# Patient Record
Sex: FEMALE | Race: WHITE | NOT HISPANIC OR LATINO | URBAN - METROPOLITAN AREA
[De-identification: names, ages, dates, MRNs, and addresses within clinical notes are randomized per-mention and may not be internally consistent; named-entity substitution may affect disease eponyms.]

---

## 2018-05-07 VITALS
RESPIRATION RATE: 18 BRPM | DIASTOLIC BLOOD PRESSURE: 80 MMHG | HEART RATE: 91 BPM | WEIGHT: 148.59 LBS | TEMPERATURE: 98 F | SYSTOLIC BLOOD PRESSURE: 123 MMHG | OXYGEN SATURATION: 99 %

## 2018-05-07 LAB
ALBUMIN SERPL ELPH-MCNC: 4.9 G/DL — SIGNIFICANT CHANGE UP (ref 3.3–5)
ALP SERPL-CCNC: 88 U/L — SIGNIFICANT CHANGE UP (ref 40–120)
ALT FLD-CCNC: 92 U/L — HIGH (ref 10–45)
ANION GAP SERPL CALC-SCNC: 14 MMOL/L — SIGNIFICANT CHANGE UP (ref 5–17)
APPEARANCE UR: CLEAR — SIGNIFICANT CHANGE UP
APTT BLD: 38.4 SEC — HIGH (ref 27.5–37.4)
AST SERPL-CCNC: 92 U/L — HIGH (ref 10–40)
BASOPHILS NFR BLD AUTO: 0.2 % — SIGNIFICANT CHANGE UP (ref 0–2)
BILIRUB SERPL-MCNC: 0.5 MG/DL — SIGNIFICANT CHANGE UP (ref 0.2–1.2)
BILIRUB UR-MCNC: (no result)
BUN SERPL-MCNC: 17 MG/DL — SIGNIFICANT CHANGE UP (ref 7–23)
CALCIUM SERPL-MCNC: 9.6 MG/DL — SIGNIFICANT CHANGE UP (ref 8.4–10.5)
CHLORIDE SERPL-SCNC: 100 MMOL/L — SIGNIFICANT CHANGE UP (ref 96–108)
CO2 SERPL-SCNC: 25 MMOL/L — SIGNIFICANT CHANGE UP (ref 22–31)
COLOR SPEC: YELLOW — SIGNIFICANT CHANGE UP
CREAT SERPL-MCNC: 0.66 MG/DL — SIGNIFICANT CHANGE UP (ref 0.5–1.3)
DIFF PNL FLD: NEGATIVE — SIGNIFICANT CHANGE UP
EOSINOPHIL NFR BLD AUTO: 0.7 % — SIGNIFICANT CHANGE UP (ref 0–6)
GLUCOSE SERPL-MCNC: 111 MG/DL — HIGH (ref 70–99)
GLUCOSE UR QL: NEGATIVE — SIGNIFICANT CHANGE UP
HCT VFR BLD CALC: 36 % — SIGNIFICANT CHANGE UP (ref 34.5–45)
HGB BLD-MCNC: 12.1 G/DL — SIGNIFICANT CHANGE UP (ref 11.5–15.5)
INR BLD: 1.09 — SIGNIFICANT CHANGE UP (ref 0.88–1.16)
KETONES UR-MCNC: (no result) MG/DL
LEUKOCYTE ESTERASE UR-ACNC: NEGATIVE — SIGNIFICANT CHANGE UP
LYMPHOCYTES # BLD AUTO: 16.9 % — SIGNIFICANT CHANGE UP (ref 13–44)
MCHC RBC-ENTMCNC: 28.5 PG — SIGNIFICANT CHANGE UP (ref 27–34)
MCHC RBC-ENTMCNC: 33.6 G/DL — SIGNIFICANT CHANGE UP (ref 32–36)
MCV RBC AUTO: 84.7 FL — SIGNIFICANT CHANGE UP (ref 80–100)
MONOCYTES NFR BLD AUTO: 6.4 % — SIGNIFICANT CHANGE UP (ref 2–14)
NEUTROPHILS NFR BLD AUTO: 75.8 % — SIGNIFICANT CHANGE UP (ref 43–77)
NITRITE UR-MCNC: NEGATIVE — SIGNIFICANT CHANGE UP
PH UR: 7.5 — SIGNIFICANT CHANGE UP (ref 5–8)
PLATELET # BLD AUTO: 336 K/UL — SIGNIFICANT CHANGE UP (ref 150–400)
POTASSIUM SERPL-MCNC: 4.2 MMOL/L — SIGNIFICANT CHANGE UP (ref 3.5–5.3)
POTASSIUM SERPL-SCNC: 4.2 MMOL/L — SIGNIFICANT CHANGE UP (ref 3.5–5.3)
PROT SERPL-MCNC: 7.9 G/DL — SIGNIFICANT CHANGE UP (ref 6–8.3)
PROT UR-MCNC: NEGATIVE MG/DL — SIGNIFICANT CHANGE UP
PROTHROM AB SERPL-ACNC: 12.1 SEC — SIGNIFICANT CHANGE UP (ref 9.8–12.7)
RBC # BLD: 4.25 M/UL — SIGNIFICANT CHANGE UP (ref 3.8–5.2)
RBC # FLD: 13.9 % — SIGNIFICANT CHANGE UP (ref 10.3–16.9)
SODIUM SERPL-SCNC: 139 MMOL/L — SIGNIFICANT CHANGE UP (ref 135–145)
SP GR SPEC: 1.01 — SIGNIFICANT CHANGE UP (ref 1–1.03)
UROBILINOGEN FLD QL: 1 E.U./DL — SIGNIFICANT CHANGE UP
WBC # BLD: 4.4 K/UL — SIGNIFICANT CHANGE UP (ref 3.8–10.5)
WBC # FLD AUTO: 4.4 K/UL — SIGNIFICANT CHANGE UP (ref 3.8–10.5)

## 2018-05-07 PROCEDURE — 74177 CT ABD & PELVIS W/CONTRAST: CPT | Mod: 26

## 2018-05-07 PROCEDURE — 99285 EMERGENCY DEPT VISIT HI MDM: CPT

## 2018-05-07 RX ORDER — MORPHINE SULFATE 50 MG/1
4 CAPSULE, EXTENDED RELEASE ORAL ONCE
Qty: 0 | Refills: 0 | Status: DISCONTINUED | OUTPATIENT
Start: 2018-05-07 | End: 2018-05-07

## 2018-05-07 RX ORDER — ONDANSETRON 8 MG/1
4 TABLET, FILM COATED ORAL ONCE
Qty: 0 | Refills: 0 | Status: COMPLETED | OUTPATIENT
Start: 2018-05-07 | End: 2018-05-07

## 2018-05-07 RX ORDER — IOHEXOL 300 MG/ML
50 INJECTION, SOLUTION INTRAVENOUS ONCE
Qty: 0 | Refills: 0 | Status: COMPLETED | OUTPATIENT
Start: 2018-05-07 | End: 2018-05-07

## 2018-05-07 RX ORDER — SODIUM CHLORIDE 9 MG/ML
1000 INJECTION INTRAMUSCULAR; INTRAVENOUS; SUBCUTANEOUS ONCE
Qty: 0 | Refills: 0 | Status: COMPLETED | OUTPATIENT
Start: 2018-05-07 | End: 2018-05-07

## 2018-05-07 RX ADMIN — SODIUM CHLORIDE 1000 MILLILITER(S): 9 INJECTION INTRAMUSCULAR; INTRAVENOUS; SUBCUTANEOUS at 15:00

## 2018-05-07 RX ADMIN — MORPHINE SULFATE 4 MILLIGRAM(S): 50 CAPSULE, EXTENDED RELEASE ORAL at 18:04

## 2018-05-07 RX ADMIN — ONDANSETRON 4 MILLIGRAM(S): 8 TABLET, FILM COATED ORAL at 15:43

## 2018-05-07 RX ADMIN — IOHEXOL 50 MILLILITER(S): 300 INJECTION, SOLUTION INTRAVENOUS at 16:36

## 2018-05-07 RX ADMIN — MORPHINE SULFATE 4 MILLIGRAM(S): 50 CAPSULE, EXTENDED RELEASE ORAL at 15:43

## 2018-05-07 RX ADMIN — ONDANSETRON 4 MILLIGRAM(S): 8 TABLET, FILM COATED ORAL at 19:59

## 2018-05-07 NOTE — ED ADULT NURSE NOTE - PSH
Congenital anomaly of brain  Colloid cyst of third ventricle  Deviated septum  repaired  H-type congenital tracheoesophageal fistula  s/p repair

## 2018-05-07 NOTE — ED ADULT NURSE NOTE - PMH
Asthma    Congenital anomaly of brain  Colloid cyst of third ventricle  Congenital tracheoesophageal fistula, esophageal atresia and stenosis  Tracheoesophageal fistula, congenital  Endometriosis    Epilepsy  Epilepsy  GERD (gastroesophageal reflux disease)    Myalgia and myositis  Fibromyalgia

## 2018-05-07 NOTE — CONSULT NOTE ADULT - SUBJECTIVE AND OBJECTIVE BOX
33yo G0 with hx of endometriosis was sent from GYN Dr. Multani's office for intolerable abdominal pain. Pt states constant, lower cramping abdominal pain for 2 months. She states associated nausea and vomiting. Last bowel movement was "one week ago." Pt states she gets Percocet from psychiatrist for pain control which she tried at home with no relief. She has been at [facility] twice in the last 2 months and was discharged with diagnosis of "gastroparesis." She states she had CT scan done there which was negative. LMP was 18. Pt has associated urinary frequency but no dysuria. Pt feels these symptoms are similar to the symptoms she had in 2016 when her endometriosis was at its "worst."  Pt denies fever, chills, chest pain, SOB, vaginal bleeding, lightheadedness or dizziness.     OB/GYN Hx: G0   Pt denies hx of fibroids   Laparoscopic endometriosis excision 2016   PMHx: Endometriosis, GERD, Fibromyalgia, Anxiety   SHx: Laparoscopic endometriosis excision 2016   Meds: Lyrica 600mg BID, Zofran, Percocet, Pantoprazole   Allergies: Avelox, Biaxin, Compazine, Doxycyline, Robaxin     PHYSICAL EXAM:   Vital Signs Last 24 Hrs  T(C): 36.9 (07 May 2018 14:03), Max: 36.9 (07 May 2018 14:03)  T(F): 98.5 (07 May 2018 14:03), Max: 98.5 (07 May 2018 14:03)  HR: 91 (07 May 2018 14:03) (91 - 91)  BP: 123/80 (07 May 2018 14:03) (123/80 - 123/80)  BP(mean): --  RR: 18 (07 May 2018 14:03) (18 - 18)  SpO2: 99% (07 May 2018 14:03) (99% - 99%)    **************************  Constitutional: No acute distress  Respiratory: Clear to ausculation bilaterally  Cardiovascular: regular rate and rhythm  Gastrointestinal: positive bowel sounds, soft, tenderness to palpation lower abdomen, no rebound or guarding   Pelvic exam: normal external genitalia, no masses felt, no CMT or adnexal tenderness   Extremities: no calf tenderness or swelling    LABS:                        12.1   4.4   )-----------( 336      ( 07 May 2018 15:05 )             36.0     05-    139  |  100  |  17  ----------------------------<  111<H>  4.2   |  25  |  0.66    Ca    9.6      07 May 2018 15:05    TPro  7.9  /  Alb  4.9  /  TBili  0.5  /  DBili  x   /  AST  92<H>  /  ALT  92<H>  /  AlkPhos  88  05-07    PT/INR - ( 07 May 2018 15:05 )   PT: 12.1 sec;   INR: 1.09          PTT - ( 07 May 2018 15:05 )  PTT:38.4 sec  Urinalysis Basic - ( 07 May 2018 15:12 )    Color: Yellow / Appearance: Clear / S.015 / pH: x  Gluc: x / Ketone: Trace mg/dL  / Bili: Small / Urobili: 1.0 E.U./dL   Blood: x / Protein: NEGATIVE mg/dL / Nitrite: NEGATIVE   Leuk Esterase: NEGATIVE / RBC: x / WBC x   Sq Epi: x / Non Sq Epi: x / Bacteria: x          RADIOLOGY & ADDITIONAL STUDIES:

## 2018-05-07 NOTE — ED PROVIDER NOTE - MEDICAL DECISION MAKING DETAILS
pt with hx of endometriosis s/p surgery 2016 presents from [facility] office for evaluation of abd pain x 2 months - pe - seems to be in diffuse abd pain without focal tenderness or guarding asking for multiple doses of pain meds in ED - ct shows ? dilated cbd us neg for stones and seems to be normal cbd, pelvic us normal study, discussed extensively with gyn throughout patient's stay in ED and decision made to admit for pain control with +/- lap.

## 2018-05-07 NOTE — CONSULT NOTE ADULT - ASSESSMENT
31yo G0 with hx of endometriosis was sent from GYN Dr. Multani's office for intolerable abdominal pain. Pt received Morphine in ED with minimal relief. Vital signs stable, labs and UA within normal limits. CT pending.

## 2018-05-08 ENCOUNTER — INPATIENT (INPATIENT)
Facility: HOSPITAL | Age: 33
LOS: 0 days | Discharge: HOME | DRG: 760 | End: 2018-05-08
Attending: OBSTETRICS & GYNECOLOGY | Admitting: OBSTETRICS & GYNECOLOGY
Payer: COMMERCIAL

## 2018-05-08 ENCOUNTER — TRANSCRIPTION ENCOUNTER (OUTPATIENT)
Age: 33
End: 2018-05-08

## 2018-05-08 VITALS
SYSTOLIC BLOOD PRESSURE: 120 MMHG | TEMPERATURE: 98 F | HEART RATE: 80 BPM | DIASTOLIC BLOOD PRESSURE: 83 MMHG | OXYGEN SATURATION: 97 % | RESPIRATION RATE: 17 BRPM

## 2018-05-08 DIAGNOSIS — F33.9 MAJOR DEPRESSIVE DISORDER, RECURRENT, UNSPECIFIED: ICD-10-CM

## 2018-05-08 DIAGNOSIS — Q39.2 CONGENITAL TRACHEO-ESOPHAGEAL FISTULA WITHOUT ATRESIA: Chronic | ICD-10-CM

## 2018-05-08 DIAGNOSIS — J34.2 DEVIATED NASAL SEPTUM: Chronic | ICD-10-CM

## 2018-05-08 PROCEDURE — 76705 ECHO EXAM OF ABDOMEN: CPT

## 2018-05-08 PROCEDURE — 76830 TRANSVAGINAL US NON-OB: CPT

## 2018-05-08 PROCEDURE — 96376 TX/PRO/DX INJ SAME DRUG ADON: CPT

## 2018-05-08 PROCEDURE — 96374 THER/PROPH/DIAG INJ IV PUSH: CPT | Mod: XU

## 2018-05-08 PROCEDURE — 99223 1ST HOSP IP/OBS HIGH 75: CPT

## 2018-05-08 PROCEDURE — 96375 TX/PRO/DX INJ NEW DRUG ADDON: CPT

## 2018-05-08 PROCEDURE — 99285 EMERGENCY DEPT VISIT HI MDM: CPT | Mod: 25

## 2018-05-08 PROCEDURE — 76705 ECHO EXAM OF ABDOMEN: CPT | Mod: 26

## 2018-05-08 PROCEDURE — 74177 CT ABD & PELVIS W/CONTRAST: CPT

## 2018-05-08 PROCEDURE — 76830 TRANSVAGINAL US NON-OB: CPT | Mod: 26

## 2018-05-08 RX ORDER — ONDANSETRON 8 MG/1
4 TABLET, FILM COATED ORAL EVERY 6 HOURS
Qty: 0 | Refills: 0 | Status: DISCONTINUED | OUTPATIENT
Start: 2018-05-08 | End: 2018-05-08

## 2018-05-08 RX ORDER — MORPHINE SULFATE 50 MG/1
4 CAPSULE, EXTENDED RELEASE ORAL ONCE
Qty: 0 | Refills: 0 | Status: DISCONTINUED | OUTPATIENT
Start: 2018-05-08 | End: 2018-05-08

## 2018-05-08 RX ORDER — ACETAMINOPHEN 500 MG
1000 TABLET ORAL ONCE
Qty: 0 | Refills: 0 | Status: COMPLETED | OUTPATIENT
Start: 2018-05-08 | End: 2018-05-08

## 2018-05-08 RX ORDER — ONDANSETRON 8 MG/1
0 TABLET, FILM COATED ORAL
Qty: 0 | Refills: 0 | COMMUNITY

## 2018-05-08 RX ORDER — KETOROLAC TROMETHAMINE 30 MG/ML
30 SYRINGE (ML) INJECTION EVERY 6 HOURS
Qty: 0 | Refills: 0 | Status: DISCONTINUED | OUTPATIENT
Start: 2018-05-08 | End: 2018-05-08

## 2018-05-08 RX ORDER — PANTOPRAZOLE SODIUM 20 MG/1
40 TABLET, DELAYED RELEASE ORAL
Qty: 0 | Refills: 0 | Status: DISCONTINUED | OUTPATIENT
Start: 2018-05-08 | End: 2018-05-08

## 2018-05-08 RX ORDER — ONDANSETRON 8 MG/1
4 TABLET, FILM COATED ORAL ONCE
Qty: 0 | Refills: 0 | Status: COMPLETED | OUTPATIENT
Start: 2018-05-08 | End: 2018-05-08

## 2018-05-08 RX ORDER — LEVETIRACETAM 250 MG/1
1000 TABLET, FILM COATED ORAL
Qty: 0 | Refills: 0 | Status: DISCONTINUED | OUTPATIENT
Start: 2018-05-08 | End: 2018-05-08

## 2018-05-08 RX ORDER — PANTOPRAZOLE SODIUM 20 MG/1
1 TABLET, DELAYED RELEASE ORAL
Qty: 0 | Refills: 0 | COMMUNITY
Start: 2018-05-08

## 2018-05-08 RX ORDER — SODIUM CHLORIDE 9 MG/ML
1000 INJECTION, SOLUTION INTRAVENOUS
Qty: 0 | Refills: 0 | Status: DISCONTINUED | OUTPATIENT
Start: 2018-05-08 | End: 2018-05-08

## 2018-05-08 RX ORDER — DIPHENHYDRAMINE HCL 50 MG
25 CAPSULE ORAL ONCE
Qty: 0 | Refills: 0 | Status: COMPLETED | OUTPATIENT
Start: 2018-05-08 | End: 2018-05-08

## 2018-05-08 RX ORDER — POLYETHYLENE GLYCOL 3350 17 G/17G
17 POWDER, FOR SOLUTION ORAL DAILY
Qty: 0 | Refills: 0 | Status: DISCONTINUED | OUTPATIENT
Start: 2018-05-08 | End: 2018-05-08

## 2018-05-08 RX ORDER — OXYCODONE AND ACETAMINOPHEN 5; 325 MG/1; MG/1
1 TABLET ORAL EVERY 4 HOURS
Qty: 0 | Refills: 0 | Status: DISCONTINUED | OUTPATIENT
Start: 2018-05-08 | End: 2018-05-08

## 2018-05-08 RX ORDER — HYDROMORPHONE HYDROCHLORIDE 2 MG/ML
0.5 INJECTION INTRAMUSCULAR; INTRAVENOUS; SUBCUTANEOUS ONCE
Qty: 0 | Refills: 0 | Status: DISCONTINUED | OUTPATIENT
Start: 2018-05-08 | End: 2018-05-08

## 2018-05-08 RX ORDER — ACETAMINOPHEN 500 MG
650 TABLET ORAL ONCE
Qty: 0 | Refills: 0 | Status: COMPLETED | OUTPATIENT
Start: 2018-05-08 | End: 2018-05-08

## 2018-05-08 RX ADMIN — Medication 1000 MILLIGRAM(S): at 02:35

## 2018-05-08 RX ADMIN — Medication 30 MILLIGRAM(S): at 12:25

## 2018-05-08 RX ADMIN — Medication 225 MILLIGRAM(S): at 17:15

## 2018-05-08 RX ADMIN — SODIUM CHLORIDE 125 MILLILITER(S): 9 INJECTION, SOLUTION INTRAVENOUS at 02:51

## 2018-05-08 RX ADMIN — MORPHINE SULFATE 4 MILLIGRAM(S): 50 CAPSULE, EXTENDED RELEASE ORAL at 00:48

## 2018-05-08 RX ADMIN — OXYCODONE AND ACETAMINOPHEN 1 TABLET(S): 5; 325 TABLET ORAL at 17:57

## 2018-05-08 RX ADMIN — HYDROMORPHONE HYDROCHLORIDE 0.5 MILLIGRAM(S): 2 INJECTION INTRAMUSCULAR; INTRAVENOUS; SUBCUTANEOUS at 03:30

## 2018-05-08 RX ADMIN — Medication 225 MILLIGRAM(S): at 02:51

## 2018-05-08 RX ADMIN — Medication 650 MILLIGRAM(S): at 12:03

## 2018-05-08 RX ADMIN — Medication 30 MILLIGRAM(S): at 17:35

## 2018-05-08 RX ADMIN — Medication 25 MILLIGRAM(S): at 04:20

## 2018-05-08 RX ADMIN — Medication 30 MILLIGRAM(S): at 17:15

## 2018-05-08 RX ADMIN — ONDANSETRON 4 MILLIGRAM(S): 8 TABLET, FILM COATED ORAL at 12:03

## 2018-05-08 RX ADMIN — HYDROMORPHONE HYDROCHLORIDE 0.5 MILLIGRAM(S): 2 INJECTION INTRAMUSCULAR; INTRAVENOUS; SUBCUTANEOUS at 04:00

## 2018-05-08 RX ADMIN — ONDANSETRON 4 MILLIGRAM(S): 8 TABLET, FILM COATED ORAL at 01:39

## 2018-05-08 RX ADMIN — MORPHINE SULFATE 4 MILLIGRAM(S): 50 CAPSULE, EXTENDED RELEASE ORAL at 01:39

## 2018-05-08 RX ADMIN — Medication 30 MILLIGRAM(S): at 12:07

## 2018-05-08 RX ADMIN — ONDANSETRON 4 MILLIGRAM(S): 8 TABLET, FILM COATED ORAL at 17:15

## 2018-05-08 RX ADMIN — Medication 400 MILLIGRAM(S): at 01:40

## 2018-05-08 NOTE — DISCHARGE NOTE ADULT - HOSPITAL COURSE
Pt was admitted with acute on chronic pain in the setting of endometriosis.  Pain improved and pt remained clinically stable without any indication for operative management.  Low suspicion for acute abdomen.  Discharged home in stable condition with plan for outpatient follow up.

## 2018-05-08 NOTE — DISCHARGE NOTE ADULT - MEDICATION SUMMARY - MEDICATIONS TO TAKE
I will START or STAY ON the medications listed below when I get home from the hospital:    bisacodyl 10 mg rectal suppository  -- 1 suppository(ies) rectally once a day, As needed, Constipation  -- Indication: For constipation    pantoprazole 40 mg oral delayed release tablet  -- 1 tab(s) by mouth once a day (before a meal)  -- Indication: For GERD

## 2018-05-08 NOTE — DISCHARGE NOTE ADULT - PLAN OF CARE
n/a Continue oral pain medications as needed.  Follow up in office for preoperative counseling and planning.

## 2018-05-08 NOTE — DISCHARGE NOTE ADULT - CARE PROVIDER_API CALL
Olivia Multani), Obstetrics and Gynecology  [address] [place] [address]  Phone: [phone]  Fax: [phone]

## 2018-05-08 NOTE — DISCHARGE NOTE ADULT - CARE PLAN
Principal Discharge DX:	Endometriosis  Goal:	n/a  Assessment and plan of treatment:	Continue oral pain medications as needed.  Follow up in office for preoperative counseling and planning.

## 2018-05-08 NOTE — PROGRESS NOTE ADULT - ASSESSMENT
31yo G0 with hx of endometriosis s/p l/s endo resection 2016 presents with intractable pelvic pain. Patient to be admitted for pain management.                                    1. Neuro/Pain:  IV tylenol prn  2  CV:   VS per routine  3. Pulm: Encourage ISS  4. GI: NPO, dulcolax suppository  5. :  Voiding  6. Heme: Stable  7. ID: --  8. DVT ppx: SCDs  9. Dispo: improvement of pain 31yo HD2 w/hx of endometriosis s/p l/s endo resection 2016 presents with intractable abdominal pain. Patient is admitted for pain control. Physical examination not consistent with Pt's verbal pain level of 10/10.                              1. Neuro/Pain: Tylenol prn   2  CV:   VS per routine  3. Pulm: Encourage ISS  4. GI: NPO, dulcolax suppository  5. :  Voiding  6. Heme: Stable  7. ID: --  8. DVT ppx: SCDs  9. F/u psych consult

## 2018-05-08 NOTE — PROGRESS NOTE ADULT - ASSESSMENT
33yo G0 with hx of endometriosis s/p l/s endo resection 2016 presents with intractable pelvic pain. Patient to be admitted for pain management.                                    1. Neuro/Pain:  IV tylenol prn  2  CV:   VS per routine  3. Pulm: Encourage ISS  4. GI: NPO, dulcolax suppository  5. :  Voiding  6. Heme: Stable  7. ID: --  8. DVT ppx: SCDs  9. Dispo: improvement of pain

## 2018-05-08 NOTE — PROGRESS NOTE ADULT - SUBJECTIVE AND OBJECTIVE BOX
Patient seen and evaluated at bedside. Pt states 6/10 pain not controlled with Toradol or Tylenol. Pt states she wants Morphine or Dilaudid because that is what helps with her pain.    Pt denies fever, chills, chest pain, SOB, nausea, vomiting, lightheadedness, or dizziness.      T(F): 98.4 (18 @ 12:58), Max: 98.7 (18 @ 00:52)  HR: 54 (18 @ 12:58) (54 - 77)  BP: 117/81 (18 @ 12:58) (115/81 - 131/90)  RR: 16 (18 @ 12:58) (16 - 18)  SpO2: 99% (18 @ 12:58) (98% - 100%)  Wt(kg): --  I&O's Summary    08 May 2018 07:01  -  08 May 2018 14:07  --------------------------------------------------------  IN: 0 mL / OUT: 650 mL / NET: -650 mL    MEDICATIONS  (STANDING):  lactated ringers. 1000 milliLiter(s) (125 mL/Hr) IV Continuous <Continuous>  pantoprazole    Tablet 40 milliGRAM(s) Oral before breakfast  pregabalin 225 milliGRAM(s) Oral every 12 hours    MEDICATIONS  (PRN):  bisacodyl Suppository 10 milliGRAM(s) Rectal daily PRN Constipation  ketorolac   Injectable 30 milliGRAM(s) IV Push every 6 hours PRN Severe Pain  ondansetron Injectable 4 milliGRAM(s) IV Push every 6 hours PRN Nausea and/or Vomiting    Physical Exam:  Constitutional: NAD  Abdomen: Soft, tenderness to deep palpation, nondistended, no guarding, no rebound, +bowel sounds  Extremities: no lower extremity edema or calve tenderness. SCDs in place     LABS:                        12.1   4.4   )-----------( 336      ( 07 May 2018 15:05 )             36.0     05-07    139  |  100  |  17  ----------------------------<  111<H>  4.2   |  25  |  0.66    Ca    9.6      07 May 2018 15:05    TPro  7.9  /  Alb  4.9  /  TBili  0.5  /  DBili  x   /  AST  92<H>  /  ALT  92<H>  /  AlkPhos  88  05-07    PT/INR - ( 07 May 2018 15:05 )   PT: 12.1 sec;   INR: 1.09          PTT - ( 07 May 2018 15:05 )  PTT:38.4 sec  Urinalysis Basic - ( 07 May 2018 15:12 )    Color: Yellow / Appearance: Clear / S.015 / pH: x  Gluc: x / Ketone: Trace mg/dL  / Bili: Small / Urobili: 1.0 E.U./dL   Blood: x / Protein: NEGATIVE mg/dL / Nitrite: NEGATIVE   Leuk Esterase: NEGATIVE / RBC: x / WBC x   Sq Epi: x / Non Sq Epi: x / Bacteria: x

## 2018-05-08 NOTE — BEHAVIORAL HEALTH ASSESSMENT NOTE - NSBHCHARTREVIEWVS_PSY_A_CORE FT
Vital Signs Last 24 Hrs  T(C): 36.9 (08 May 2018 12:58), Max: 37.1 (08 May 2018 00:52)  T(F): 98.4 (08 May 2018 12:58), Max: 98.7 (08 May 2018 00:52)  HR: 54 (08 May 2018 12:58) (54 - 77)  BP: 117/81 (08 May 2018 12:58) (115/81 - 131/90)  BP(mean): --  RR: 16 (08 May 2018 12:58) (16 - 18)  SpO2: 99% (08 May 2018 12:58) (98% - 100%)

## 2018-05-08 NOTE — BEHAVIORAL HEALTH ASSESSMENT NOTE - NSBHMEDSOTHERFT_PSY_A_CORE
Klonopin 1 mg BID, Lyrica 600 mg pO BID, Percocet 10 mg PO q4h, Trintellix 20 mg pO daily (patient says not taking), Protonix 40 mg pO daily

## 2018-05-08 NOTE — H&P ADULT - HISTORY OF PRESENT ILLNESS
33yo G0 with hx of endometriosis was sent from GYN Dr. Multani's office for intolerable abdominal pain. Pt states constant, lower cramping abdominal pain for 2 months. She states associated nausea and vomiting. Last bowel movement was "one week ago." Pt states she gets Percocet from psychiatrist for pain control which she tried at home with no relief. She has been at [facility] twice in the last 2 months and was discharged with diagnosis of "gastroparesis." She states she had CT scan done there which was negative. LMP was 18. Pt has associated urinary frequency but no dysuria. Pt feels these symptoms are similar to the symptoms she had in 2016 when her endometriosis was at its "worst."  Pt denies fever, chills, chest pain, SOB, vaginal bleeding, lightheadedness or dizziness.     OB/GYN Hx: G0   Pt denies hx of fibroids   Laparoscopic endometriosis excision 2016   PMHx: Endometriosis, GERD, Fibromyalgia, Anxiety, epilepsy  SHx: Laparoscopic endometriosis excision 2016, TE fistula repair as baby, spinal cord tumor removal  Meds: Lyrica 600mg BID, Zofran, Percocet, Pantoprazole, keppra 1000mg BID, klonopin  Allergies: Avelox, Biaxin, Compazine, Doxycyline, Robaxin     Vital Signs Last 24 Hrs  T(C): 37 (08 May 2018 01:23), Max: 37.1 (08 May 2018 00:52)  T(F): 98.6 (08 May 2018 01:23), Max: 98.7 (08 May 2018 00:52)  HR: 76 (08 May 2018 01:23) (73 - 91)  BP: 125/80 (08 May 2018 01:23) (123/80 - 131/90)  BP(mean): --  RR: 17 (08 May 2018 01:23) (17 - 18)  SpO2: 99% (08 May 2018 01:23) (98% - 100%)  Constitutional: No acute distress  Respiratory: Clear to ausculation bilaterally  Cardiovascular: regular rate and rhythm  Gastrointestinal: positive bowel sounds, soft, tenderness to palpation lower abdomen, no rebound or guarding   Pelvic exam: normal external genitalia, no palapable adnexal or rectovaginal masses, mild tenderness to right adnexa, no CMT   Extremities: no calf tenderness or swelling                          12.1   4.4   )-----------( 336      ( 07 May 2018 15:05 )             36.0       139  |  100  |  17  ----------------------------<  111<H>  4.2   |  25  |  0.66    Ca    9.6      07 May 2018 15:05    TPro  7.9  /  Alb  4.9  /  TBili  0.5  /  DBili  x   /  AST  92<H>  /  ALT  92<H>  /  AlkPhos  88    Urinalysis Basic - ( 07 May 2018 15:12 )    Color: Yellow / Appearance: Clear / S.015 / pH: x  Gluc: x / Ketone: Trace mg/dL  / Bili: Small / Urobili: 1.0 E.U./dL   Blood: x / Protein: NEGATIVE mg/dL / Nitrite: NEGATIVE   Leuk Esterase: NEGATIVE / RBC: x / WBC x   Sq Epi: x / Non Sq Epi: x / Bacteria: x      EXAM:  CT ABDOMEN AND PELVIS OC IC                          PROCEDURE DATE:  2018    Quantity of Contrast in Vial in ml: 100 Contrast Used: Optiray 350  Quantity of Contrast Wasted in ml: 8           INTERPRETATION:      CT of the ABDOMEN and PELVIS with intravenous contrast dated 2018   7:25 PM    INDICATION: Diffuse abdominal pain. Nausea and vomiting. History of   endometriosis.    TECHNIQUE: CT of the abdomen and pelvis was performed using oral and   intravenous contrast. Axial, sagittal and coronal images were produced   and reviewed.    PRIOR STUDIES: None.    FINDINGS: Images of the lower chest are unremarkable.    The liver is normal in appearance.  There is mild intra as well as extra   hepatic bile duct dilatation with the common bile duct measuring up to 7   mm. No CT evidence of choledocholithiasis or cholelithiasis. No   radiopaque stones are seen in the gallbladder.  The pancreas is normal in   appearance. No pancreatic duct dilatation. No splenic abnormalities are   seen.    The adrenal glands are unremarkable. The kidneys are normal in   appearance.        No abdominal aortic aneurysm is seen. No lymphadenopathy is seen.     Evaluation of the bowel demonstrates no bowel obstruction or free air.   Oral contrast reaches the rectum. There is mild wall thickening of the   sigmoid colon. No ascites is seen. Appendix is normal.     Images of the pelvis demonstrate the uterus and adnexae to be normal in   appearance. Urinary bladder is underdistended.      Evaluation of the osseous structures demonstrates no significant   abnormality.      IMPRESSION:    Mild wall thickening of the sigmoid colon which may be infectious or   inflammatory in etiology.    Mild intrahepatic as well as common bile duct dilatation measuring up to   7 mm. No CT evidence of cholelithiasis or choledocholithiasis. Further   imaging may be of value to include a dedicated hepatobiliary ultrasound   and as clinically indicated, an MRCP.    Normal appendix.      Procedure was performed in Emergency Department by a credentialed   Emergency Medicine Attending Physician    EXAM:  ER US ABDOMEN LTD [id number]                           PROCEDURE DATE:  2018                     INTERPRETATION:  Grayscale imaging of the right upper quadrant was   performed.    The gallbladder was visualized and scanned in longitudinal and transverse   planes.  The anterior gallbladder wall measured  0.31.cm.  The common bile duct measured 0.30 to 0.57.cm.  Gallstones not present.  Sludge not present.  Pericholecystic fluid not present.  Gallbladder wall edema not present.  Sonographic Blevins's sign not present.    IMPRESSION:Normal gallbladder.        Procedure was performed in Emergency Department by a credentialed   Emergency Medicine Attending Physician    EXAM:  ER US TRANSVAGINL NON OB [id number]                           PROCEDURE DATE:  2018                     INTERPRETATION:  Transabdominal and Endovaginal Grayscale imaging of the   uterus, ovaries, and adnexa performed.    The uterus was scanned in 2 planes. The uterus was empty.  The left ovary was visualized and measured 1.78cm x 2.32cm x 1.76cm.  The right ovary was visualized and measured 2.33 cm x 1.46cm x 2.89cm  Both ovaries were of normal echotexture.  The left ovary demonstrated normal arterial and venous blood flow.  The right ovary demonstrated normal arterial and venous blood flow.  There was no free fluid present.    IMPRESSION:   Normal study 33yo G0 with hx of endometriosis was sent from GYN Dr. Multani's office for intolerable abdominal pain. Pt states constant, lower cramping abdominal pain for 2 months. She states associated nausea and vomiting. Last bowel movement was "one week ago." Pt states she gets Percocet from psychiatrist for pain control which she tried at home with no relief. She has been at [facility] twice in the last 2 months and was discharged with diagnosis of "gastroparesis." She states she had CT scan done there which was negative. LMP was 18. Pt has associated urinary frequency but no dysuria. Pt feels these symptoms are similar to the symptoms she had in 2016 when her endometriosis was at its "worst."  Pt denies fever, chills, chest pain, SOB, vaginal bleeding, lightheadedness or dizziness.     OB/GYN Hx: G0   Pt denies hx of fibroids   Laparoscopic endometriosis excision 2016   PMHx: Endometriosis, GERD, Fibromyalgia, Anxiety, epilepsy - diagnosed in , had grand mal seizures. previously on keppra 1000mg BID, stopped taking over 1 year ago on her on accord, no seizures for over 1 year.  SHx: Laparoscopic endometriosis excision 2016, TE fistula repair as baby, spinal cord tumor removal  Meds: Lyrica 600mg BID, Zofran, Percocet, Pantoprazole, keppra 1000mg BID, klonopin  Allergies: Avelox, Biaxin, Compazine, Doxycyline, Robaxin     Vital Signs Last 24 Hrs  T(C): 37 (08 May 2018 01:23), Max: 37.1 (08 May 2018 00:52)  T(F): 98.6 (08 May 2018 01:23), Max: 98.7 (08 May 2018 00:52)  HR: 76 (08 May 2018 01:23) (73 - 91)  BP: 125/80 (08 May 2018 01:23) (123/80 - 131/90)  BP(mean): --  RR: 17 (08 May 2018 01:23) (17 - 18)  SpO2: 99% (08 May 2018 01:23) (98% - 100%)  Constitutional: No acute distress  Respiratory: Clear to ausculation bilaterally  Cardiovascular: regular rate and rhythm  Gastrointestinal: positive bowel sounds, soft, tenderness to palpation lower abdomen, no rebound or guarding   Pelvic exam: normal external genitalia, no palapable adnexal or rectovaginal masses, mild tenderness to right adnexa, no CMT   Extremities: no calf tenderness or swelling                          12.1   4.4   )-----------( 336      ( 07 May 2018 15:05 )             36.0       139  |  100  |  17  ----------------------------<  111<H>  4.2   |  25  |  0.66    Ca    9.6      07 May 2018 15:05    TPro  7.9  /  Alb  4.9  /  TBili  0.5  /  DBili  x   /  AST  92<H>  /  ALT  92<H>  /  AlkPhos  88    Urinalysis Basic - ( 07 May 2018 15:12 )    Color: Yellow / Appearance: Clear / S.015 / pH: x  Gluc: x / Ketone: Trace mg/dL  / Bili: Small / Urobili: 1.0 E.U./dL   Blood: x / Protein: NEGATIVE mg/dL / Nitrite: NEGATIVE   Leuk Esterase: NEGATIVE / RBC: x / WBC x   Sq Epi: x / Non Sq Epi: x / Bacteria: x      EXAM:  CT ABDOMEN AND PELVIS OC IC                          PROCEDURE DATE:  2018    Quantity of Contrast in Vial in ml: 100 Contrast Used: Optiray 350  Quantity of Contrast Wasted in ml: 8           INTERPRETATION:      CT of the ABDOMEN and PELVIS with intravenous contrast dated 2018   7:25 PM    INDICATION: Diffuse abdominal pain. Nausea and vomiting. History of   endometriosis.    TECHNIQUE: CT of the abdomen and pelvis was performed using oral and   intravenous contrast. Axial, sagittal and coronal images were produced   and reviewed.    PRIOR STUDIES: None.    FINDINGS: Images of the lower chest are unremarkable.    The liver is normal in appearance.  There is mild intra as well as extra   hepatic bile duct dilatation with the common bile duct measuring up to 7   mm. No CT evidence of choledocholithiasis or cholelithiasis. No   radiopaque stones are seen in the gallbladder.  The pancreas is normal in   appearance. No pancreatic duct dilatation. No splenic abnormalities are   seen.    The adrenal glands are unremarkable. The kidneys are normal in   appearance.        No abdominal aortic aneurysm is seen. No lymphadenopathy is seen.     Evaluation of the bowel demonstrates no bowel obstruction or free air.   Oral contrast reaches the rectum. There is mild wall thickening of the   sigmoid colon. No ascites is seen. Appendix is normal.     Images of the pelvis demonstrate the uterus and adnexae to be normal in   appearance. Urinary bladder is underdistended.      Evaluation of the osseous structures demonstrates no significant   abnormality.      IMPRESSION:    Mild wall thickening of the sigmoid colon which may be infectious or   inflammatory in etiology.    Mild intrahepatic as well as common bile duct dilatation measuring up to   7 mm. No CT evidence of cholelithiasis or choledocholithiasis. Further   imaging may be of value to include a dedicated hepatobiliary ultrasound   and as clinically indicated, an MRCP.    Normal appendix.      Procedure was performed in Emergency Department by a credentialed   Emergency Medicine Attending Physician    EXAM:  ER US ABDOMEN LTD [id number]                           PROCEDURE DATE:  2018                     INTERPRETATION:  Grayscale imaging of the right upper quadrant was   performed.    The gallbladder was visualized and scanned in longitudinal and transverse   planes.  The anterior gallbladder wall measured  0.31.cm.  The common bile duct measured 0.30 to 0.57.cm.  Gallstones not present.  Sludge not present.  Pericholecystic fluid not present.  Gallbladder wall edema not present.  Sonographic Blevins's sign not present.    IMPRESSION:Normal gallbladder.        Procedure was performed in Emergency Department by a credentialed   Emergency Medicine Attending Physician    EXAM:  ER US TRANSVAGINL NON OB [id number]                           PROCEDURE DATE:  2018                     INTERPRETATION:  Transabdominal and Endovaginal Grayscale imaging of the   uterus, ovaries, and adnexa performed.    The uterus was scanned in 2 planes. The uterus was empty.  The left ovary was visualized and measured 1.78cm x 2.32cm x 1.76cm.  The right ovary was visualized and measured 2.33 cm x 1.46cm x 2.89cm  Both ovaries were of normal echotexture.  The left ovary demonstrated normal arterial and venous blood flow.  The right ovary demonstrated normal arterial and venous blood flow.  There was no free fluid present.    IMPRESSION:   Normal study

## 2018-05-08 NOTE — PROGRESS NOTE ADULT - SUBJECTIVE AND OBJECTIVE BOX
Patient seen and evaluated at bedside. Patient reports pain and nausea are still present but better controlled after dilaudid and benadryl. She is NPO overnight. She otherwise has no concerns.    Vital Signs Last 24 Hrs  T(C): 36.5 (08 May 2018 05:51), Max: 37.1 (08 May 2018 00:52)  T(F): 97.7 (08 May 2018 05:51), Max: 98.7 (08 May 2018 00:52)  HR: 67 (08 May 2018 05:51) (67 - 91)  BP: 115/81 (08 May 2018 05:51) (115/81 - 131/90)  BP(mean): --  RR: 17 (08 May 2018 05:51) (17 - 18)  SpO2: 99% (08 May 2018 05:51) (98% - 100%)  Gen: NAD  Pulm: nonlabored breathing  Abd: soft, diffuse mild tenderness, nondistended, no rebound or guarding    MEDICATIONS  (STANDING):  lactated ringers. 1000 milliLiter(s) (125 mL/Hr) IV Continuous <Continuous>  pantoprazole    Tablet 40 milliGRAM(s) Oral before breakfast  pregabalin 225 milliGRAM(s) Oral every 12 hours    MEDICATIONS  (PRN):  bisacodyl Suppository 10 milliGRAM(s) Rectal daily PRN Constipation  ketorolac   Injectable 30 milliGRAM(s) IV Push every 6 hours PRN Severe Pain  ondansetron Injectable 4 milliGRAM(s) IV Push every 6 hours PRN Nausea and/or Vomiting

## 2018-05-08 NOTE — BEHAVIORAL HEALTH ASSESSMENT NOTE - NSBHCONSULTMEDS_PSY_A_CORE FT
1. Restart Klonopin 1 mg pO BID  2. Restart Lyrica 600 mg pO BID  3. Further pain control as per primary team- patient takes Percocet 10 mg PO q4h for pain at home so is unlikely to respond to lower opiate doses

## 2018-05-08 NOTE — H&P ADULT - ASSESSMENT
33yo G0 with hx of endometriosis s/p l/s endo resection 2016 presents with intractable pelvic pain. Patient to be admitted for pain management.                                    1. Neuro/Pain:  IV tylenol prn  2  CV:   VS per routine  3. Pulm: Encourage ISS  4. GI: NPO  5. :  Voiding  6. Heme: Stable  7. ID: --  8. DVT ppx: SCDs  9. Dispo: improvement of pain
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
## 2018-05-08 NOTE — BEHAVIORAL HEALTH ASSESSMENT NOTE - SUMMARY
32F h/o MDD and anxiety, fibromyalgia, multiple medical issues, presenting for pain similar to prior endometriosis pain.  Case complicated by use of outpatient Percocet and Lyrica for chronic pain issues as prescribed by outpatient psychiatrist.  Patient with belief that endometriosis surgery is quick and uncomplicated and would like to be discharged if she is not going to get surgery imminently.  Would discuss procedure for and role of surgery with patient so she has accurate understanding of medical situation.  Patient with high opiate use as outaptient but is closely monitored by outpatient psychiatrist Dr. Cardenas.

## 2018-05-08 NOTE — BEHAVIORAL HEALTH ASSESSMENT NOTE - NSBHADMITCOUNSEL_PSY_A_CORE
diagnostic results/impressions and/or recommended studies/risks and benefits of treatment options/instructions for management, treatment and follow up/risk factor reduction/prognosis/importance of adherence to chosen treatment/client/family/caregiver education

## 2018-05-08 NOTE — BEHAVIORAL HEALTH ASSESSMENT NOTE - NSBHCHARTREVIEWLAB_PSY_A_CORE FT
12.1   4.4   )-----------( 336      ( 07 May 2018 15:05 )             36.0   05-07    139  |  100  |  17  ----------------------------<  111<H>  4.2   |  25  |  0.66    Ca    9.6      07 May 2018 15:05    TPro  7.9  /  Alb  4.9  /  TBili  0.5  /  DBili  x   /  AST  92<H>  /  ALT  92<H>  /  AlkPhos  88  05-07

## 2018-05-08 NOTE — DISCHARGE NOTE ADULT - PATIENT PORTAL LINK FT
You can access the [facility] Patient Portal, offered by [facility], by registering with the following website: [email or web address]

## 2018-05-11 DIAGNOSIS — J45.909 UNSPECIFIED ASTHMA, UNCOMPLICATED: ICD-10-CM

## 2018-05-11 DIAGNOSIS — J34.2 DEVIATED NASAL SEPTUM: ICD-10-CM

## 2018-05-11 DIAGNOSIS — F33.9 MAJOR DEPRESSIVE DISORDER, RECURRENT, UNSPECIFIED: ICD-10-CM

## 2018-05-11 DIAGNOSIS — Q39.2 CONGENITAL TRACHEO-ESOPHAGEAL FISTULA WITHOUT ATRESIA: ICD-10-CM

## 2018-05-11 DIAGNOSIS — M79.7 FIBROMYALGIA: ICD-10-CM

## 2018-05-11 DIAGNOSIS — K21.9 GASTRO-ESOPHAGEAL REFLUX DISEASE WITHOUT ESOPHAGITIS: ICD-10-CM

## 2018-05-11 DIAGNOSIS — R10.9 UNSPECIFIED ABDOMINAL PAIN: ICD-10-CM

## 2018-05-11 DIAGNOSIS — N80.9 ENDOMETRIOSIS, UNSPECIFIED: ICD-10-CM

## 2018-05-29 PROBLEM — N80.9 ENDOMETRIOSIS, UNSPECIFIED: Chronic | Status: ACTIVE | Noted: 2018-05-07

## 2018-06-01 VITALS
OXYGEN SATURATION: 97 % | HEIGHT: 65 IN | HEART RATE: 85 BPM | WEIGHT: 142.42 LBS | SYSTOLIC BLOOD PRESSURE: 105 MMHG | RESPIRATION RATE: 17 BRPM | TEMPERATURE: 97 F | DIASTOLIC BLOOD PRESSURE: 75 MMHG

## 2018-06-01 NOTE — ASU PREOP CHECKLIST - SELECT TESTS ORDERED
CMP/PT/PTT/INR/CBC/Urinalysis ucg-/CMP/PT/PTT/INR/CBC/Urinalysis Urinalysis/ucg- negative/CMP/PT/PTT/CBC/INR

## 2018-06-04 ENCOUNTER — RESULT REVIEW (OUTPATIENT)
Age: 33
End: 2018-06-04

## 2018-06-04 ENCOUNTER — INPATIENT (INPATIENT)
Facility: HOSPITAL | Age: 33
LOS: 0 days | Discharge: HOME | DRG: 745 | End: 2018-06-05
Attending: SPECIALIST | Admitting: SPECIALIST
Payer: COMMERCIAL

## 2018-06-04 DIAGNOSIS — R10.9 UNSPECIFIED ABDOMINAL PAIN: ICD-10-CM

## 2018-06-04 DIAGNOSIS — N80.9 ENDOMETRIOSIS, UNSPECIFIED: ICD-10-CM

## 2018-06-04 DIAGNOSIS — Q39.2 CONGENITAL TRACHEO-ESOPHAGEAL FISTULA WITHOUT ATRESIA: Chronic | ICD-10-CM

## 2018-06-04 DIAGNOSIS — K43.2 INCISIONAL HERNIA WITHOUT OBSTRUCTION OR GANGRENE: ICD-10-CM

## 2018-06-04 DIAGNOSIS — N13.5 CROSSING VESSEL AND STRICTURE OF URETER WITHOUT HYDRONEPHROSIS: ICD-10-CM

## 2018-06-04 DIAGNOSIS — J45.909 UNSPECIFIED ASTHMA, UNCOMPLICATED: ICD-10-CM

## 2018-06-04 DIAGNOSIS — K31.84 GASTROPARESIS: ICD-10-CM

## 2018-06-04 DIAGNOSIS — G40.909 EPILEPSY, UNSPECIFIED, NOT INTRACTABLE, WITHOUT STATUS EPILEPTICUS: ICD-10-CM

## 2018-06-04 DIAGNOSIS — M79.7 FIBROMYALGIA: ICD-10-CM

## 2018-06-04 DIAGNOSIS — K21.9 GASTRO-ESOPHAGEAL REFLUX DISEASE WITHOUT ESOPHAGITIS: ICD-10-CM

## 2018-06-04 DIAGNOSIS — J34.2 DEVIATED NASAL SEPTUM: Chronic | ICD-10-CM

## 2018-06-04 DIAGNOSIS — Q04.9 CONGENITAL MALFORMATION OF BRAIN, UNSPECIFIED: ICD-10-CM

## 2018-06-04 LAB
ALBUMIN SERPL ELPH-MCNC: 4.1 G/DL — SIGNIFICANT CHANGE UP (ref 3.3–5)
ALP SERPL-CCNC: 64 U/L — SIGNIFICANT CHANGE UP (ref 40–120)
ALT FLD-CCNC: 10 U/L — SIGNIFICANT CHANGE UP (ref 10–45)
ANION GAP SERPL CALC-SCNC: 17 MMOL/L — SIGNIFICANT CHANGE UP (ref 5–17)
AST SERPL-CCNC: 23 U/L — SIGNIFICANT CHANGE UP (ref 10–40)
BILIRUB SERPL-MCNC: 0.3 MG/DL — SIGNIFICANT CHANGE UP (ref 0.2–1.2)
BUN SERPL-MCNC: 7 MG/DL — SIGNIFICANT CHANGE UP (ref 7–23)
CALCIUM SERPL-MCNC: 8.8 MG/DL — SIGNIFICANT CHANGE UP (ref 8.4–10.5)
CHLORIDE SERPL-SCNC: 97 MMOL/L — SIGNIFICANT CHANGE UP (ref 96–108)
CO2 SERPL-SCNC: 23 MMOL/L — SIGNIFICANT CHANGE UP (ref 22–31)
CREAT SERPL-MCNC: 0.54 MG/DL — SIGNIFICANT CHANGE UP (ref 0.5–1.3)
GLUCOSE SERPL-MCNC: 126 MG/DL — HIGH (ref 70–99)
HCT VFR BLD CALC: 34.7 % — SIGNIFICANT CHANGE UP (ref 34.5–45)
HGB BLD-MCNC: 11.4 G/DL — LOW (ref 11.5–15.5)
MCHC RBC-ENTMCNC: 27.1 PG — SIGNIFICANT CHANGE UP (ref 27–34)
MCHC RBC-ENTMCNC: 32.9 G/DL — SIGNIFICANT CHANGE UP (ref 32–36)
MCV RBC AUTO: 82.4 FL — SIGNIFICANT CHANGE UP (ref 80–100)
PLATELET # BLD AUTO: 258 K/UL — SIGNIFICANT CHANGE UP (ref 150–400)
POTASSIUM SERPL-MCNC: 3.3 MMOL/L — LOW (ref 3.5–5.3)
POTASSIUM SERPL-SCNC: 3.3 MMOL/L — LOW (ref 3.5–5.3)
PROT SERPL-MCNC: 6.7 G/DL — SIGNIFICANT CHANGE UP (ref 6–8.3)
RBC # BLD: 4.21 M/UL — SIGNIFICANT CHANGE UP (ref 3.8–5.2)
RBC # FLD: 13.2 % — SIGNIFICANT CHANGE UP (ref 10.3–16.9)
SODIUM SERPL-SCNC: 137 MMOL/L — SIGNIFICANT CHANGE UP (ref 135–145)
WBC # BLD: 10.4 K/UL — SIGNIFICANT CHANGE UP (ref 3.8–10.5)
WBC # FLD AUTO: 10.4 K/UL — SIGNIFICANT CHANGE UP (ref 3.8–10.5)

## 2018-06-04 RX ORDER — KETOROLAC TROMETHAMINE 30 MG/ML
30 SYRINGE (ML) INJECTION EVERY 6 HOURS
Qty: 0 | Refills: 0 | Status: DISCONTINUED | OUTPATIENT
Start: 2018-06-04 | End: 2018-06-05

## 2018-06-04 RX ORDER — MORPHINE SULFATE 50 MG/1
4 CAPSULE, EXTENDED RELEASE ORAL ONCE
Qty: 0 | Refills: 0 | Status: DISCONTINUED | OUTPATIENT
Start: 2018-06-04 | End: 2018-06-04

## 2018-06-04 RX ORDER — SODIUM CHLORIDE 9 MG/ML
1000 INJECTION, SOLUTION INTRAVENOUS
Qty: 0 | Refills: 0 | Status: DISCONTINUED | OUTPATIENT
Start: 2018-06-04 | End: 2018-06-05

## 2018-06-04 RX ORDER — SODIUM CHLORIDE 9 MG/ML
500 INJECTION, SOLUTION INTRAVENOUS
Qty: 0 | Refills: 0 | Status: DISCONTINUED | OUTPATIENT
Start: 2018-06-04 | End: 2018-06-05

## 2018-06-04 RX ORDER — SODIUM CHLORIDE 9 MG/ML
250 INJECTION, SOLUTION INTRAVENOUS ONCE
Qty: 0 | Refills: 0 | Status: COMPLETED | OUTPATIENT
Start: 2018-06-04 | End: 2018-06-04

## 2018-06-04 RX ORDER — HYDROMORPHONE HYDROCHLORIDE 2 MG/ML
0.5 INJECTION INTRAMUSCULAR; INTRAVENOUS; SUBCUTANEOUS ONCE
Qty: 0 | Refills: 0 | Status: DISCONTINUED | OUTPATIENT
Start: 2018-06-04 | End: 2018-06-04

## 2018-06-04 RX ORDER — SIMETHICONE 80 MG/1
80 TABLET, CHEWABLE ORAL THREE TIMES A DAY
Qty: 0 | Refills: 0 | Status: DISCONTINUED | OUTPATIENT
Start: 2018-06-04 | End: 2018-06-05

## 2018-06-04 RX ORDER — HYDROMORPHONE HYDROCHLORIDE 2 MG/ML
0.5 INJECTION INTRAMUSCULAR; INTRAVENOUS; SUBCUTANEOUS
Qty: 0 | Refills: 0 | Status: DISCONTINUED | OUTPATIENT
Start: 2018-06-04 | End: 2018-06-05

## 2018-06-04 RX ORDER — ACETAMINOPHEN 500 MG
975 TABLET ORAL ONCE
Qty: 0 | Refills: 0 | Status: COMPLETED | OUTPATIENT
Start: 2018-06-04 | End: 2018-06-04

## 2018-06-04 RX ORDER — DOCUSATE SODIUM 100 MG
100 CAPSULE ORAL ONCE
Qty: 0 | Refills: 0 | Status: COMPLETED | OUTPATIENT
Start: 2018-06-04 | End: 2018-06-04

## 2018-06-04 RX ORDER — ONDANSETRON 8 MG/1
2 TABLET, FILM COATED ORAL ONCE
Qty: 0 | Refills: 0 | Status: DISCONTINUED | OUTPATIENT
Start: 2018-06-04 | End: 2018-06-05

## 2018-06-04 RX ORDER — OXYCODONE AND ACETAMINOPHEN 5; 325 MG/1; MG/1
1 TABLET ORAL ONCE
Qty: 0 | Refills: 0 | Status: DISCONTINUED | OUTPATIENT
Start: 2018-06-04 | End: 2018-06-04

## 2018-06-04 RX ORDER — HYDROMORPHONE HYDROCHLORIDE 2 MG/ML
0.5 INJECTION INTRAMUSCULAR; INTRAVENOUS; SUBCUTANEOUS
Qty: 0 | Refills: 0 | Status: DISCONTINUED | OUTPATIENT
Start: 2018-06-04 | End: 2018-06-04

## 2018-06-04 RX ADMIN — MORPHINE SULFATE 4 MILLIGRAM(S): 50 CAPSULE, EXTENDED RELEASE ORAL at 17:45

## 2018-06-04 RX ADMIN — Medication 600 MILLIGRAM(S): at 22:09

## 2018-06-04 RX ADMIN — MORPHINE SULFATE 4 MILLIGRAM(S): 50 CAPSULE, EXTENDED RELEASE ORAL at 17:14

## 2018-06-04 RX ADMIN — SODIUM CHLORIDE 500 MILLILITER(S): 9 INJECTION, SOLUTION INTRAVENOUS at 14:56

## 2018-06-04 RX ADMIN — Medication 30 MILLIGRAM(S): at 17:14

## 2018-06-04 RX ADMIN — Medication 30 MILLIGRAM(S): at 17:45

## 2018-06-04 RX ADMIN — SODIUM CHLORIDE 120 MILLILITER(S): 9 INJECTION, SOLUTION INTRAVENOUS at 20:47

## 2018-06-04 RX ADMIN — HYDROMORPHONE HYDROCHLORIDE 0.5 MILLIGRAM(S): 2 INJECTION INTRAMUSCULAR; INTRAVENOUS; SUBCUTANEOUS at 21:00

## 2018-06-04 RX ADMIN — OXYCODONE AND ACETAMINOPHEN 1 TABLET(S): 5; 325 TABLET ORAL at 18:18

## 2018-06-04 RX ADMIN — HYDROMORPHONE HYDROCHLORIDE 0.5 MILLIGRAM(S): 2 INJECTION INTRAMUSCULAR; INTRAVENOUS; SUBCUTANEOUS at 23:06

## 2018-06-04 RX ADMIN — SODIUM CHLORIDE 120 MILLILITER(S): 9 INJECTION, SOLUTION INTRAVENOUS at 14:42

## 2018-06-04 RX ADMIN — OXYCODONE AND ACETAMINOPHEN 1 TABLET(S): 5; 325 TABLET ORAL at 19:18

## 2018-06-04 RX ADMIN — HYDROMORPHONE HYDROCHLORIDE 0.5 MILLIGRAM(S): 2 INJECTION INTRAMUSCULAR; INTRAVENOUS; SUBCUTANEOUS at 16:56

## 2018-06-04 RX ADMIN — HYDROMORPHONE HYDROCHLORIDE 0.5 MILLIGRAM(S): 2 INJECTION INTRAMUSCULAR; INTRAVENOUS; SUBCUTANEOUS at 23:21

## 2018-06-04 RX ADMIN — SODIUM CHLORIDE 1000 MILLILITER(S): 9 INJECTION, SOLUTION INTRAVENOUS at 15:45

## 2018-06-04 RX ADMIN — HYDROMORPHONE HYDROCHLORIDE 0.5 MILLIGRAM(S): 2 INJECTION INTRAMUSCULAR; INTRAVENOUS; SUBCUTANEOUS at 20:47

## 2018-06-04 RX ADMIN — HYDROMORPHONE HYDROCHLORIDE 0.5 MILLIGRAM(S): 2 INJECTION INTRAMUSCULAR; INTRAVENOUS; SUBCUTANEOUS at 15:53

## 2018-06-04 NOTE — BRIEF OPERATIVE NOTE - PROCEDURE
<<-----Click on this checkbox to enter Procedure Hysteroscopy  06/04/2018    Active  CELESTE  Dilation and curettage  06/04/2018    Active  CELESTE  Laparoscopy with biopsy  06/04/2018    Active  MDISTURCO

## 2018-06-04 NOTE — PROGRESS NOTE ADULT - SUBJECTIVE AND OBJECTIVE BOX
Called to bedside by PACU nurse to assess pt due to hypotension, with BPs in 70s-80s/40s-50s; HR WNL at 60s-70s.  The patient appears in distress due to significant pain however is alert and oriented.  She has no complaints aside from significant abdominal pain which she is not tolerating at this time.  Reports that she generally does not get relief from Morphine or Toradol and only gets relief with dilaudid.  She denies nausea/vomiting/fever/chills/chest pain/palpitations/shortness of breath/dizziness/lightheadedness.  Has not yet been out of bed or attempted PO.  Griffin and ovarian suspensions in situ.      Vital Signs Last 24 Hrs  T(C): 36 (04 Jun 2018 14:30), Max: 36 (04 Jun 2018 14:30)  T(F): 96.8 (04 Jun 2018 14:30), Max: 96.8 (04 Jun 2018 14:30)  HR: 72 (04 Jun 2018 15:54) (60 - 82)  BP: 112/67 (04 Jun 2018 15:54) (79/46 - 112/67)  BP(mean): 86 (04 Jun 2018 15:54) (53 - 86)  RR: 20 (04 Jun 2018 15:54) (19 - 30)  SpO2: 100% (04 Jun 2018 15:54) (95% - 100%)    Physical Exam:  Gen: AAOx3. Appears in distress due to pain; pale  Pulm: Tachypneic, Clear to auscultation bilaterally  GI: soft,. mildly distended, +BS, +TTP in lower quadrants, no rebound, no guarding.  Laparoscopic port sites c/d/i with steristrips.  Ovarian suspensions in place bilaterally.  Ext: SCDs in place, wnl    I&O's Summary    04 Jun 2018 07:01  -  04 Jun 2018 16:08  --------------------------------------------------------  IN: 740 mL / OUT: 200 mL / NET: 540 mL      MEDICATIONS  (STANDING):  acetaminophen   Tablet. 975 milliGRAM(s) Oral once  ketorolac   Injectable 30 milliGRAM(s) IV Push every 6 hours  lactated ringers. 500 milliLiter(s) (1000 mL/Hr) IV Continuous <Continuous>  lactated ringers. 1000 milliLiter(s) (120 mL/Hr) IV Continuous <Continuous>    MEDICATIONS  (PRN):  morphine  - Injectable 4 milliGRAM(s) IV Push Once PRN breakthrough pain  ondansetron Injectable 2 milliGRAM(s) IV Push Once PRN Nausea and/or Vomiting  oxyCODONE    5 mG/acetaminophen 325 mG 1 Tablet(s) Oral Once PRN Moderate Pain (4 - 6)

## 2018-06-04 NOTE — BRIEF OPERATIVE NOTE - OPERATION/FINDINGS
minimal endometriosis, gallbladder cholestasis, uterine cavity unable to visualized due to menstrual cycle

## 2018-06-04 NOTE — PROGRESS NOTE ADULT - ASSESSMENT
31 yo POD0 s/p laparoscopic endometriosis excision, uncomplicated procedure with minimal blood loss, assessed now due to hypotension and pain.  Pt is not tachycardic and has a benign abdominal exam with low suspicion for ongoing bleeding.  Afebrile.  Making good urine.  Received 1500cc of crystalloid in OR, now receiving bolus of 250+500cc with appropriate response so far. Pt has history of chronic pain and requires Percocet, Gabapentin and Fentanyl patch at home. Giving 0.5 of Dilaudid for immediate pain relief however would ideally avoid due to concern for hypotension. Will send CBC and CMP to assess for anemia or electrolyte disturbance.  Anesthesiology called to bedside, will also consult pain management for further recommendations.    d/w Dr. Sanchez

## 2018-06-05 ENCOUNTER — TRANSCRIPTION ENCOUNTER (OUTPATIENT)
Age: 33
End: 2018-06-05

## 2018-06-05 VITALS
RESPIRATION RATE: 16 BRPM | OXYGEN SATURATION: 98 % | HEART RATE: 67 BPM | TEMPERATURE: 98 F | DIASTOLIC BLOOD PRESSURE: 74 MMHG | SYSTOLIC BLOOD PRESSURE: 117 MMHG

## 2018-06-05 LAB
HCT VFR BLD CALC: 32.7 % — LOW (ref 34.5–45)
HGB BLD-MCNC: 11 G/DL — LOW (ref 11.5–15.5)
MCHC RBC-ENTMCNC: 27 PG — SIGNIFICANT CHANGE UP (ref 27–34)
MCHC RBC-ENTMCNC: 33.6 G/DL — SIGNIFICANT CHANGE UP (ref 32–36)
MCV RBC AUTO: 80.1 FL — SIGNIFICANT CHANGE UP (ref 80–100)
PLATELET # BLD AUTO: 289 K/UL — SIGNIFICANT CHANGE UP (ref 150–400)
RBC # BLD: 4.08 M/UL — SIGNIFICANT CHANGE UP (ref 3.8–5.2)
RBC # FLD: 13.4 % — SIGNIFICANT CHANGE UP (ref 10.3–16.9)
WBC # BLD: 9.2 K/UL — SIGNIFICANT CHANGE UP (ref 3.8–10.5)
WBC # FLD AUTO: 9.2 K/UL — SIGNIFICANT CHANGE UP (ref 3.8–10.5)

## 2018-06-05 PROCEDURE — 86850 RBC ANTIBODY SCREEN: CPT

## 2018-06-05 PROCEDURE — 88305 TISSUE EXAM BY PATHOLOGIST: CPT

## 2018-06-05 PROCEDURE — 86900 BLOOD TYPING SEROLOGIC ABO: CPT

## 2018-06-05 PROCEDURE — 80053 COMPREHEN METABOLIC PANEL: CPT

## 2018-06-05 PROCEDURE — 85027 COMPLETE CBC AUTOMATED: CPT

## 2018-06-05 PROCEDURE — 86901 BLOOD TYPING SEROLOGIC RH(D): CPT

## 2018-06-05 PROCEDURE — 36415 COLL VENOUS BLD VENIPUNCTURE: CPT

## 2018-06-05 RX ORDER — DOCUSATE SODIUM 100 MG
100 CAPSULE ORAL
Qty: 0 | Refills: 0 | Status: DISCONTINUED | OUTPATIENT
Start: 2018-06-05 | End: 2018-06-05

## 2018-06-05 RX ORDER — HYDROMORPHONE HYDROCHLORIDE 2 MG/ML
0.5 INJECTION INTRAMUSCULAR; INTRAVENOUS; SUBCUTANEOUS ONCE
Qty: 0 | Refills: 0 | Status: DISCONTINUED | OUTPATIENT
Start: 2018-06-05 | End: 2018-06-05

## 2018-06-05 RX ORDER — OXYCODONE AND ACETAMINOPHEN 5; 325 MG/1; MG/1
2 TABLET ORAL EVERY 4 HOURS
Qty: 0 | Refills: 0 | Status: DISCONTINUED | OUTPATIENT
Start: 2018-06-05 | End: 2018-06-05

## 2018-06-05 RX ADMIN — Medication 600 MILLIGRAM(S): at 09:00

## 2018-06-05 RX ADMIN — HYDROMORPHONE HYDROCHLORIDE 0.5 MILLIGRAM(S): 2 INJECTION INTRAMUSCULAR; INTRAVENOUS; SUBCUTANEOUS at 01:36

## 2018-06-05 RX ADMIN — OXYCODONE AND ACETAMINOPHEN 2 TABLET(S): 5; 325 TABLET ORAL at 11:00

## 2018-06-05 RX ADMIN — HYDROMORPHONE HYDROCHLORIDE 0.5 MILLIGRAM(S): 2 INJECTION INTRAMUSCULAR; INTRAVENOUS; SUBCUTANEOUS at 06:58

## 2018-06-05 RX ADMIN — Medication 30 MILLIGRAM(S): at 06:33

## 2018-06-05 RX ADMIN — Medication 30 MILLIGRAM(S): at 13:00

## 2018-06-05 RX ADMIN — Medication 30 MILLIGRAM(S): at 12:15

## 2018-06-05 RX ADMIN — Medication 30 MILLIGRAM(S): at 00:37

## 2018-06-05 RX ADMIN — Medication 30 MILLIGRAM(S): at 06:18

## 2018-06-05 RX ADMIN — HYDROMORPHONE HYDROCHLORIDE 0.5 MILLIGRAM(S): 2 INJECTION INTRAMUSCULAR; INTRAVENOUS; SUBCUTANEOUS at 10:30

## 2018-06-05 RX ADMIN — Medication 30 MILLIGRAM(S): at 00:22

## 2018-06-05 RX ADMIN — HYDROMORPHONE HYDROCHLORIDE 0.5 MILLIGRAM(S): 2 INJECTION INTRAMUSCULAR; INTRAVENOUS; SUBCUTANEOUS at 06:43

## 2018-06-05 RX ADMIN — HYDROMORPHONE HYDROCHLORIDE 0.5 MILLIGRAM(S): 2 INJECTION INTRAMUSCULAR; INTRAVENOUS; SUBCUTANEOUS at 09:47

## 2018-06-05 RX ADMIN — HYDROMORPHONE HYDROCHLORIDE 0.5 MILLIGRAM(S): 2 INJECTION INTRAMUSCULAR; INTRAVENOUS; SUBCUTANEOUS at 01:21

## 2018-06-05 RX ADMIN — OXYCODONE AND ACETAMINOPHEN 2 TABLET(S): 5; 325 TABLET ORAL at 12:00

## 2018-06-05 NOTE — DISCHARGE NOTE ADULT - HOSPITAL COURSE
Pt admitted postoperative after l/s endometriosis excision. Hospital course otherwise uneventful. Pt ambulating and tolerating PO at the time of discharge. VSS

## 2018-06-05 NOTE — DISCHARGE NOTE ADULT - CARE PROVIDER_API CALL
Onofre Sancehz), Obstetrics and Gynecology  [address] [place] [address]  Phone: [phone]  Fax: [phone]

## 2018-06-05 NOTE — PROGRESS NOTE ADULT - ASSESSMENT
31 yo POD1 s/p laparoscopic endometriosis excision, uncomplicated procedure with minimal blood loss.  Pt has history of chronic pain and requires Percocet, Gabapentin and Fentanyl patch at home.     1. Neuro/Pain:  ATC Toradol, Tyelnol. PRN Dilaudid  2  CV:   VS per routine  3. Pulm: Encourage ISS  4. GI: Reg as tolerated  5. :  Griffin in place, TOV now  6. Heme: AM CBC  7. ID: --  8. DVT ppx: SCDs  9. Dispo: Likely POD#1

## 2018-06-05 NOTE — DISCHARGE NOTE ADULT - CARE PLAN
Principal Discharge DX:	Endometriosis  Goal:	return home  Assessment and plan of treatment:	Follow up with your GYN doctor in 2 weeks. Call the office to schedule your appointment. Nothing in the vagina for 4 weeks. Call your GYN for prompt evaluation if you develop fever > 100.4, severe abdominal pain, foul-smelling discharge from your vagina or incision +/or heavy vaginal bleeding.

## 2018-06-05 NOTE — DISCHARGE NOTE ADULT - PLAN OF CARE
return home Follow up with your GYN doctor in 2 weeks. Call the office to schedule your appointment. Nothing in the vagina for 4 weeks. Call your GYN for prompt evaluation if you develop fever > 100.4, severe abdominal pain, foul-smelling discharge from your vagina or incision +/or heavy vaginal bleeding.

## 2018-06-05 NOTE — CONSULT NOTE ADULT - SUBJECTIVE AND OBJECTIVE BOX
Pain Management Consult Note - [facility] [phone]    Chief Complaint: abdominal pain, incisional site pain    HPI: Patient seen at 09:10am. Patient sitting up in bed, in no apparent distress. Patient visibly anxious, complaining of abdominal pain, pain when taking a deep breath and pain when walking or moving around. Patient following Dr. Ange Cardenas pain management and  is on high dose pain medications at home which include oxycodone 10/325mg, fentanyl patch and lyrica 600mg po daily. Patient expresses pain relief only with Dilaudid IV. Discussed plan with obygn resident and patient to avoid Dilaudid IV and to transition to home dose pain medications. Patient verbalized understanding.       Pain is ___ sharp __x__dull ___burning __x_achy ___ Intensity: ____ mild _x__mod _x__severe __x__ opiod tolerance    Location __x__surgical site ____cervical _____lumbar __x__abd ____upper ext____lower ext    Worse with _x___activity __x__movement _____physical therapy___ Rest    Improved with __x__medication __x__rest ____physical therapy    ROS: Const:  _-__febrile   Eyes:___ENT:___CV: __-_chest pain  Resp: __-__sob  GI:_-__nausea _-__vomiting __x_abd pain ___npo __x_clears __full diet __bm  :___ Musk: x___pain _x__spasm  Skin:___ Neuro:  _-__sqbvgawj__-_aeclttrgk_-__ numbness _x__weakness _-__paresth  Psych:_x_anxiety  Endo:___ Heme:___Allergy:_________, __x_all others reviewed and negative    PAST MEDICAL & SURGICAL HISTORY:  Endometriosis  GERD (gastroesophageal reflux disease)  Asthma  Myalgia and myositis: Fibromyalgia  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  Epilepsy: Epilepsy  Congenital tracheoesophageal fistula, esophageal atresia and stenosis: Tracheoesophageal fistula, congenital  H-type congenital tracheoesophageal fistula: s/p repair  Deviated septum: repaired  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      SH: _-__Tobacco   _-__Alcohol                          FH:FAMILY HISTORY:  No pertinent family history: No significant family history      docusate sodium 100 milliGRAM(s) Oral two times a day  ketorolac   Injectable 30 milliGRAM(s) IV Push every 6 hours  ondansetron Injectable 2 milliGRAM(s) IV Push Once PRN  oxyCODONE    5 mG/acetaminophen 325 mG 2 Tablet(s) Oral every 4 hours PRN  pregabalin 600 milliGRAM(s) Oral two times a day  simethicone 80 milliGRAM(s) Chew three times a day PRN      T(C): 36.7 (06-05-18 @ 08:54), Max: 37.5 (06-04-18 @ 17:00)  HR: 67 (06-05-18 @ 08:54) (60 - 86)  BP: 117/74 (06-05-18 @ 08:54) (79/46 - 130/78)  RR: 16 (06-05-18 @ 08:54) (14 - 30)  SpO2: 98% (06-05-18 @ 08:54) (95% - 100%)  Wt(kg): --    T(C): 36.7 (06-05-18 @ 08:54), Max: 37.5 (06-04-18 @ 17:00)  HR: 67 (06-05-18 @ 08:54) (60 - 86)  BP: 117/74 (06-05-18 @ 08:54) (79/46 - 130/78)  RR: 16 (06-05-18 @ 08:54) (14 - 30)  SpO2: 98% (06-05-18 @ 08:54) (95% - 100%)  Wt(kg): --    T(C): 36.7 (06-05-18 @ 08:54), Max: 37.5 (06-04-18 @ 17:00)  HR: 67 (06-05-18 @ 08:54) (60 - 86)  BP: 117/74 (06-05-18 @ 08:54) (79/46 - 130/78)  RR: 16 (06-05-18 @ 08:54) (14 - 30)  SpO2: 98% (06-05-18 @ 08:54) (95% - 100%)  Wt(kg): --    PHYSICAL EXAM:  Gen Appearance: _x__pt anxious, hemodynamically stable _x__appropriate        Neuro: _x__SILT feet____ EOM Intact Psych: AAOX_3_, x___mood/affect appropriate        Eyes: _x__conjunctiva WNL  _____ Pupils equal and round        ENT: __x_ears and nose atraumatic__x_ Hearing grossly intact        Neck: _x__trachea midline, no visible masses __x_thyroid without palpable mass    Resp: ___Nml WOB____No tactile fremitus ___clear to auscultation    Cardio: ___extremities free from edema __x__pedal pulses palpable    GI/Abdomen: __x_soft __x___appropriately tender to touch___x___Nondistended_____HSM    Lymphatic: ___no palpable nodes in neck  _x__no palpable nodes calves and feet    Skin/Wound: _x__Incision, _x__Dressing c/d/i,   _x___surrounding tissues soft,  ___drain/chest tube present____    Muscular: EHL __5_/5  Gastrocnemius_5__/5    _x__absent clubbing/cyanosis      ASSESSMENT: This is a 32y old Female with a history of   ENDOMETRIOSIS PELVIC PAIN  No pertinent family history  Handoff  MEWS Score  Endometriosis  GERD (gastroesophageal reflux disease)  Asthma  Myalgia and myositis  Congenital anomaly of brain  Epilepsy  Congenital tracheoesophageal fistula, esophageal atresia and stenosis  Endometriosis  Endometriosis  Endometriosis  Laparoscopy with biopsy  Dilation and curettage  Hysteroscopy  H-type congenital tracheoesophageal fistula  Deviated septum  Congenital anomaly of brain      Recommended Treatment PLAN:  1. Recommend home dose Lyrica 600mg PO daily  2. Recommend increasing Oxycodone to 10/325 mg q4h PO prn for moderate pain  3. Recommend Oxycodone 15/325 mg PO Q4h for severe pain  4. Recommend patient followup with Pain management MD outpatient.  5. Recommend avoiding Iv pain medication   Plan discussed with Dr. No Aden

## 2018-06-05 NOTE — DISCHARGE NOTE ADULT - PATIENT PORTAL LINK FT
You can access the [facility] Patient Portal, offered by [facility], by registering with the following website: [email or web address]

## 2018-06-05 NOTE — PROGRESS NOTE ADULT - SUBJECTIVE AND OBJECTIVE BOX
Pt evaluated at bedside this AM.   She denies nausea/vomiting/fever/chills/chest pain/palpitations/shortness of breath/dizziness/lightheadedness.  Has not yet been out of bed or attempted PO.  Miles and ovarian suspensions in situ but removed this AM. Very nervous about ambulating but reassured.       Vital Signs Last 24 Hrs  T(C): 36.9 (05 Jun 2018 06:17), Max: 37.5 (04 Jun 2018 17:00)  T(F): 98.4 (05 Jun 2018 06:17), Max: 99.5 (04 Jun 2018 17:00)  HR: 75 (05 Jun 2018 06:17) (60 - 86)  BP: 117/76 (05 Jun 2018 06:17) (79/46 - 130/78)  BP(mean): 83 (04 Jun 2018 16:24) (53 - 86)  RR: 17 (05 Jun 2018 06:17) (14 - 30)  SpO2: 96% (05 Jun 2018 06:17) (95% - 100%)    Physical Exam:  Gen: AAOx3.   Pulm: Tachypneic, Clear to auscultation bilaterally  GI: soft,. mildly distended, +BS, +TTP in lower quadrants, no rebound, no guarding.  Laparoscopic port sites c/d/i with steristrips.  Ovarian suspensions removed.  Ext: SCDs in place, wnl  Miles: clear urine                   11.4   10.4   )----------(  258       ( 04 Jun 2018 16:01 )               34.7      137    |  97     |  7      ----------------------------<  126        ( 04 Jun 2018 16:01 )  3.3     |  23     |  0.54     Ca    8.8        ( 04 Jun 2018 16:01 )    TPro  6.7    /  Alb  4.1    /  TBili  0.3    /  DBili  x      /  AST  23     /  ALT  10     /  AlkPhos  64     ( 04 Jun 2018 16:01 )    LIVER FUNCTIONS - ( 04 Jun 2018 16:01 )  Alb: 4.1 g/dL / Pro: 6.7 g/dL / ALK PHOS: 64 U/L / ALT: 10 U/L / AST: 23 U/L / GGT: x               CAPILLARY BLOOD GLUCOSE

## 2018-06-11 LAB — SURGICAL PATHOLOGY STUDY: SIGNIFICANT CHANGE UP

## 2018-07-13 ENCOUNTER — EMERGENCY (EMERGENCY)
Facility: HOSPITAL | Age: 33
LOS: 1 days | Discharge: HOME | End: 2018-07-13
Attending: EMERGENCY MEDICINE | Admitting: EMERGENCY MEDICINE
Payer: COMMERCIAL

## 2018-07-13 VITALS
WEIGHT: 132.28 LBS | OXYGEN SATURATION: 98 % | HEIGHT: 65 IN | SYSTOLIC BLOOD PRESSURE: 132 MMHG | TEMPERATURE: 99 F | HEART RATE: 100 BPM | DIASTOLIC BLOOD PRESSURE: 84 MMHG | RESPIRATION RATE: 18 BRPM

## 2018-07-13 DIAGNOSIS — J34.2 DEVIATED NASAL SEPTUM: Chronic | ICD-10-CM

## 2018-07-13 DIAGNOSIS — Q39.2 CONGENITAL TRACHEO-ESOPHAGEAL FISTULA WITHOUT ATRESIA: Chronic | ICD-10-CM

## 2018-07-13 PROCEDURE — 99282 EMERGENCY DEPT VISIT SF MDM: CPT

## 2018-07-13 PROCEDURE — 99283 EMERGENCY DEPT VISIT LOW MDM: CPT

## 2018-07-13 NOTE — ED PROVIDER NOTE - NEUROLOGICAL, MLM
Alert and oriented, left hand with decreased sensation to light touch diffusely on hand, more on 5th finger

## 2018-07-13 NOTE — ED PROVIDER NOTE - CONSTITUTIONAL, MLM
normal... anxious appearing, well nourished, awake, alert, oriented to person, place, time/situation

## 2018-07-13 NOTE — ED PROVIDER NOTE - MEDICAL DECISION MAKING DETAILS
ulnar nerve dysfunction.  discussed with referring physician Dr. Farhana Garcia.  She states patient had recent EMG in office showing ulnar nerve was "totally out."  Per mri of elbow, appears entrapped and compressed.  MRI brain without acute findings.  Her main concern was multiple calls from patient describing blue discoloration of finger and possible vascular compromise.  Discussed that currently appears normal, well perfused.  Further workup not needed at this time in the ED.  Notes patient has underlying anxiety disorder.  Discussed with patient need for followup with specialist and also followup with epilepsy given recent reported seizure.  States she has not seen her seizure doctor in quite a while because the office is unresponsive.  Will refer to specialist here.  Return precautions discussed.

## 2018-07-13 NOTE — ED ADULT NURSE NOTE - OBJECTIVE STATEMENT
PT came to ED under direction of PCP for left arm numbness. Pt states pain started after having seizures during the week. Pt denies head trauma. Pt is alert and oriented x3, Positive PMS x4 extremities. Pt reports numbness to left hand. Pt denies all other medical complaints at this time. Pt able to use her hand to hold.

## 2018-07-13 NOTE — ED PROVIDER NOTE - OBJECTIVE STATEMENT
here with intermittent pain and numbness of left hand/ especially 5th finger for more than a week, with transient episode of blue discoloration of finger a few days ago.  States she thinks she had two seizures prior to symptoms starting.  Has history of seizure disorder and is on keppra, reports compliance.  States she has had seizures before but never had arm problems after.  Followed up with a neurologist on Tuesday, Dr. Garcia, who sent her for MRI of brain and elbow showing ulnar nerve compression.  Supposed to followup with orthopedic specialist for further management but has not made appointment yet.  States she talked to her neurologist about the finger being blue and was told to come to the ED a few days ago.  Did not come initially as directed, but has been worried it was something serious (although has not been blue in a few days) so decided to come today.  Denies neck pain, fever/chills, sob.

## 2018-07-13 NOTE — ED PROVIDER NOTE - MUSCULOSKELETAL, MLM
left hand clenched in claw position.  cap refil < 2 sec.  warm, normal color.  able to extend fingers actively 2/3 of the way to straight but stops reporting pain. able to passively extend normally but screams and says it feels very funny

## 2018-07-17 DIAGNOSIS — M79.642 PAIN IN LEFT HAND: ICD-10-CM

## 2018-07-17 DIAGNOSIS — J45.909 UNSPECIFIED ASTHMA, UNCOMPLICATED: ICD-10-CM

## 2018-07-17 DIAGNOSIS — Z88.1 ALLERGY STATUS TO OTHER ANTIBIOTIC AGENTS STATUS: ICD-10-CM

## 2018-07-17 DIAGNOSIS — Z88.8 ALLERGY STATUS TO OTHER DRUGS, MEDICAMENTS AND BIOLOGICAL SUBSTANCES STATUS: ICD-10-CM

## 2018-07-17 DIAGNOSIS — G56.22 LESION OF ULNAR NERVE, LEFT UPPER LIMB: ICD-10-CM

## 2018-09-19 ENCOUNTER — APPOINTMENT (OUTPATIENT)
Dept: ORTHOPEDIC SURGERY | Facility: CLINIC | Age: 33
End: 2018-09-19
Payer: COMMERCIAL

## 2018-09-19 DIAGNOSIS — Z87.39 PERSONAL HISTORY OF OTHER DISEASES OF THE MUSCULOSKELETAL SYSTEM AND CONNECTIVE TISSUE: ICD-10-CM

## 2018-09-19 DIAGNOSIS — G56.22 LESION OF ULNAR NERVE, LEFT UPPER LIMB: ICD-10-CM

## 2018-09-19 DIAGNOSIS — Z78.9 OTHER SPECIFIED HEALTH STATUS: ICD-10-CM

## 2018-09-19 DIAGNOSIS — G40.909 EPILEPSY, UNSPECIFIED, NOT INTRACTABLE, W/OUT STATUS EPILEPTICUS: ICD-10-CM

## 2018-09-19 DIAGNOSIS — Z00.00 ENCOUNTER FOR GENERAL ADULT MEDICAL EXAMINATION W/OUT ABNORMAL FINDINGS: ICD-10-CM

## 2018-09-19 PROCEDURE — 99203 OFFICE O/P NEW LOW 30 MIN: CPT

## 2018-09-19 PROCEDURE — 73130 X-RAY EXAM OF HAND: CPT | Mod: LT

## 2018-09-19 PROCEDURE — 73070 X-RAY EXAM OF ELBOW: CPT | Mod: LT

## 2018-10-23 ENCOUNTER — CLINICAL ADVICE (OUTPATIENT)
Age: 33
End: 2018-10-23

## 2020-09-15 NOTE — ASU PATIENT PROFILE, ADULT - PMH
Tazorac Counseling:  Patient advised that medication is irritating and drying.  Patient may need to apply sparingly and wash off after an hour before eventually leaving it on overnight.  The patient verbalized understanding of the proper use and possible adverse effects of tazorac.  All of the patient's questions and concerns were addressed. Asthma    Congenital anomaly of brain  Colloid cyst of third ventricle  Congenital tracheoesophageal fistula, esophageal atresia and stenosis  Tracheoesophageal fistula, congenital  Endometriosis    Epilepsy  Epilepsy  GERD (gastroesophageal reflux disease)    Myalgia and myositis  Fibromyalgia

## 2021-02-25 NOTE — ED ADULT NURSE NOTE - CAS TRG GEN SKIN COLOR
Fax sent for recent lab work to [facility] for upcoming appt with  SARA. Fax confirmation received.    Pending records.  
Normal for race